# Patient Record
Sex: MALE | URBAN - METROPOLITAN AREA
[De-identification: names, ages, dates, MRNs, and addresses within clinical notes are randomized per-mention and may not be internally consistent; named-entity substitution may affect disease eponyms.]

---

## 2023-12-13 ENCOUNTER — LAB OUTSIDE AN ENCOUNTER (OUTPATIENT)
Dept: URBAN - METROPOLITAN AREA CLINIC 115 | Facility: CLINIC | Age: 38
End: 2023-12-13

## 2023-12-13 ENCOUNTER — OFFICE VISIT (OUTPATIENT)
Dept: URBAN - METROPOLITAN AREA CLINIC 115 | Facility: CLINIC | Age: 38
End: 2023-12-13
Payer: OTHER GOVERNMENT

## 2023-12-13 VITALS
SYSTOLIC BLOOD PRESSURE: 133 MMHG | HEIGHT: 74 IN | DIASTOLIC BLOOD PRESSURE: 86 MMHG | TEMPERATURE: 98.4 F | WEIGHT: 315 LBS | BODY MASS INDEX: 40.43 KG/M2 | HEART RATE: 85 BPM

## 2023-12-13 DIAGNOSIS — E66.01 MORBID OBESITY: ICD-10-CM

## 2023-12-13 DIAGNOSIS — K42.9 REDUCIBLE UMBILICAL HERNIA: ICD-10-CM

## 2023-12-13 DIAGNOSIS — Z12.11 COLON CANCER SCREENING: ICD-10-CM

## 2023-12-13 DIAGNOSIS — R10.13 DYSPEPSIA: ICD-10-CM

## 2023-12-13 PROBLEM — 238136002: Status: ACTIVE | Noted: 2023-12-13

## 2023-12-13 PROBLEM — 162031009: Status: ACTIVE | Noted: 2023-12-13

## 2023-12-13 PROBLEM — 305058001: Status: ACTIVE | Noted: 2023-12-13

## 2023-12-13 PROCEDURE — 99204 OFFICE O/P NEW MOD 45 MIN: CPT | Performed by: INTERNAL MEDICINE

## 2023-12-13 RX ORDER — BISACODYL 5 MG
2 TABS ONCE A DAY FOR 3 DAYS PRIOR TO COLONOSCOPY TABLET, DELAYED RELEASE (ENTERIC COATED) ORAL ONCE A DAY
Qty: 6 TABLET | Refills: 0 | OUTPATIENT
Start: 2023-12-13 | End: 2023-12-23

## 2023-12-13 RX ORDER — POLYETHYLENE GLYCOL 3350, SODIUM SULFATE ANHYDROUS, SODIUM BICARBONATE, SODIUM CHLORIDE, POTASSIUM CHLORIDE 236; 22.74; 6.74; 5.86; 2.97 G/4L; G/4L; G/4L; G/4L; G/4L
AS DIRECTED POWDER, FOR SOLUTION ORAL ONCE
Qty: 1 GALLON | Refills: 0 | OUTPATIENT
Start: 2023-12-13 | End: 2023-12-14

## 2023-12-13 RX ORDER — LAMOTRIGINE 100 MG/1
1 TABLET TABLET ORAL ONCE A DAY
Status: ACTIVE | COMMUNITY

## 2023-12-13 NOTE — HPI-TODAY'S VISIT:
Patient is a 38-year-old male came into the office for colon cancer screening and upper endoscopy evaluation.  Patient was referred from [facility] for these procedures prior to having evaluation for bariatric surgery he reports occasional epigastric discomfort he also reports having intermittent epigastric pain as well as periumbilical hernia and discomfort and pain.  Patient stated he was not offered a bed however umbilical hernia repair because of the morbid obesity.  Patient had a CT scan of the abdomen pelvis which was not available to review and he was told to have large hernia otherwise unremarkable.  He denies any lower GI symptoms.  Except for occasional gas and bloating and periumbilical pain.  Denies any unintentional weight loss.

## 2023-12-13 NOTE — PHYSICAL EXAM CONSTITUTIONAL:
obese well developed, well nourished , in no acute distress , ambulating without difficulty , normal communication ability

## 2023-12-13 NOTE — PHYSICAL EXAM GASTROINTESTINAL
Abdomen , soft, nontender, nondistended , no guarding or rigidity , no masses palpable , normal bowel sounds , Liver and Spleen,  no hepatosplenomegaly , liver nontender reducible periumbical heria

## 2024-02-07 ENCOUNTER — COL/EGD (OUTPATIENT)
Dept: URBAN - METROPOLITAN AREA MEDICAL CENTER 31 | Facility: MEDICAL CENTER | Age: 39
End: 2024-02-07
Payer: OTHER GOVERNMENT

## 2024-02-07 DIAGNOSIS — D12.3 ADENOMA OF TRANSVERSE COLON: ICD-10-CM

## 2024-02-07 DIAGNOSIS — R10.13 ABDOMINAL DISCOMFORT, EPIGASTRIC: ICD-10-CM

## 2024-02-07 DIAGNOSIS — Z12.11 COLON CANCER SCREENING: ICD-10-CM

## 2024-02-07 DIAGNOSIS — K63.5 BENIGN COLON POLYP: ICD-10-CM

## 2024-02-07 DIAGNOSIS — K31.89 ACHYLIA: ICD-10-CM

## 2024-02-07 PROCEDURE — 45385 COLONOSCOPY W/LESION REMOVAL: CPT | Performed by: INTERNAL MEDICINE

## 2024-02-07 PROCEDURE — 43239 EGD BIOPSY SINGLE/MULTIPLE: CPT | Performed by: INTERNAL MEDICINE

## 2024-02-07 RX ORDER — LAMOTRIGINE 100 MG/1
1 TABLET TABLET ORAL ONCE A DAY
Status: ACTIVE | COMMUNITY

## 2024-03-27 ENCOUNTER — OV EP (OUTPATIENT)
Dept: URBAN - METROPOLITAN AREA CLINIC 115 | Facility: CLINIC | Age: 39
End: 2024-03-27
Payer: OTHER GOVERNMENT

## 2024-03-27 VITALS
HEIGHT: 74 IN | DIASTOLIC BLOOD PRESSURE: 88 MMHG | HEART RATE: 105 BPM | SYSTOLIC BLOOD PRESSURE: 141 MMHG | TEMPERATURE: 98.2 F | BODY MASS INDEX: 40.43 KG/M2 | WEIGHT: 315 LBS

## 2024-03-27 DIAGNOSIS — K21.00 GASTROESOPHAGEAL REFLUX DISEASE WITH ESOPHAGITIS WITHOUT HEMORRHAGE: ICD-10-CM

## 2024-03-27 DIAGNOSIS — K29.50 CHRONIC GASTRITIS WITHOUT BLEEDING, UNSPECIFIED GASTRITIS TYPE: ICD-10-CM

## 2024-03-27 DIAGNOSIS — R10.13 DYSPEPSIA: ICD-10-CM

## 2024-03-27 DIAGNOSIS — K42.9 REDUCIBLE UMBILICAL HERNIA: ICD-10-CM

## 2024-03-27 DIAGNOSIS — E66.01 MORBID OBESITY: ICD-10-CM

## 2024-03-27 DIAGNOSIS — Z86.010 PERSONAL HISTORY OF COLONIC POLYPS: ICD-10-CM

## 2024-03-27 PROBLEM — 266433003: Status: ACTIVE | Noted: 2024-03-27

## 2024-03-27 PROCEDURE — 99214 OFFICE O/P EST MOD 30 MIN: CPT | Performed by: INTERNAL MEDICINE

## 2024-03-27 RX ORDER — PANTOPRAZOLE SODIUM 40 MG/1
1 TABLET TABLET, DELAYED RELEASE ORAL ONCE A DAY
Qty: 30 TABLET | Refills: 1 | Status: ACTIVE | COMMUNITY
Start: 2024-02-07

## 2024-03-27 RX ORDER — LAMOTRIGINE 100 MG/1
1 TABLET TABLET ORAL ONCE A DAY
Status: ACTIVE | COMMUNITY

## 2024-03-27 NOTE — HPI-TODAY'S VISIT:
Patient is a 38-year-old male came into the office for colon cancer screening and upper endoscopy evaluation.  Patient was referred from [facility] for these procedures prior to having evaluation for bariatric surgery he reports occasional epigastric discomfort he also reports having intermittent epigastric pain as well as periumbilical hernia and discomfort and pain.  Patient stated he was not offered a bed however umbilical hernia repair because of the morbid obesity.  Patient had a CT scan of the abdomen pelvis which was not available to review and he was told to have large hernia otherwise unremarkable.  He denies any lower GI symptoms.  Except for occasional gas and bloating and periumbilical pain.  Denies any unintentional weight loss.  3/27/24 ; Patient was seen today for follow-up after recent hospitalization procedure.  Patient underwent both upper endoscopy and colonoscopy that showed erosive esophagitis that was a gastritis.  Patient was given PPI which she has not picked up the prescription he reports having still relief ongoing reflux and on famotidine in the morning.  Patient also reports having occasional abdominal bloating but denies any symptoms suggestive of obstruction of the ventral hernia.  Patient denies any chest pain he has not been successful in losing weight by himself.  He was started on phentermine morbid obesity and his other comorbidities of high blood pressure.  Denies any chest pain.